# Patient Record
Sex: MALE
[De-identification: names, ages, dates, MRNs, and addresses within clinical notes are randomized per-mention and may not be internally consistent; named-entity substitution may affect disease eponyms.]

---

## 2018-11-24 ENCOUNTER — HOSPITAL ENCOUNTER (EMERGENCY)
Dept: HOSPITAL 92 - SCSER | Age: 43
Discharge: HOME | End: 2018-11-24
Payer: SELF-PAY

## 2018-11-24 DIAGNOSIS — F41.9: ICD-10-CM

## 2018-11-24 DIAGNOSIS — E78.5: ICD-10-CM

## 2018-11-24 DIAGNOSIS — Z79.899: ICD-10-CM

## 2018-11-24 DIAGNOSIS — K52.9: Primary | ICD-10-CM

## 2018-11-24 DIAGNOSIS — E11.9: ICD-10-CM

## 2018-11-24 LAB
ALBUMIN SERPL BCG-MCNC: 4.9 G/DL (ref 3.5–5)
ALP SERPL-CCNC: 79 U/L (ref 40–150)
ALT SERPL W P-5'-P-CCNC: 87 U/L (ref 8–55)
ANION GAP SERPL CALC-SCNC: 16 MMOL/L (ref 10–20)
AST SERPL-CCNC: 34 U/L (ref 5–34)
BASOPHILS # BLD AUTO: 0.2 THOU/UL (ref 0–0.2)
BASOPHILS NFR BLD AUTO: 1.3 % (ref 0–1)
BILIRUB SERPL-MCNC: 0.8 MG/DL (ref 0.2–1.2)
BUN SERPL-MCNC: 18 MG/DL (ref 8.9–20.6)
CALCIUM SERPL-MCNC: 9.4 MG/DL (ref 7.8–10.44)
CHLORIDE SERPL-SCNC: 108 MMOL/L (ref 98–107)
CO2 SERPL-SCNC: 16 MMOL/L (ref 22–29)
CREAT CL PREDICTED SERPL C-G-VRATE: 0 ML/MIN (ref 70–130)
EOSINOPHIL # BLD AUTO: 0.1 THOU/UL (ref 0–0.7)
EOSINOPHIL NFR BLD AUTO: 0.6 % (ref 0–10)
GLOBULIN SER CALC-MCNC: 3.6 G/DL (ref 2.4–3.5)
GLUCOSE SERPL-MCNC: 108 MG/DL (ref 70–105)
HGB BLD-MCNC: 17.2 G/DL (ref 14–18)
LYMPHOCYTES # BLD: 2.3 THOU/UL (ref 1.2–3.4)
LYMPHOCYTES NFR BLD AUTO: 19.6 % (ref 21–51)
MCH RBC QN AUTO: 30.2 PG (ref 27–31)
MCV RBC AUTO: 85.2 FL (ref 78–98)
MONOCYTES # BLD AUTO: 1.1 THOU/UL (ref 0.11–0.59)
MONOCYTES NFR BLD AUTO: 9.6 % (ref 0–10)
NEUTROPHILS # BLD AUTO: 8.1 THOU/UL (ref 1.4–6.5)
NEUTROPHILS NFR BLD AUTO: 68.9 % (ref 42–75)
PLATELET # BLD AUTO: 301 THOU/UL (ref 130–400)
POTASSIUM SERPL-SCNC: 3.3 MMOL/L (ref 3.5–5.1)
RBC # BLD AUTO: 5.7 MILL/UL (ref 4.7–6.1)
SODIUM SERPL-SCNC: 137 MMOL/L (ref 136–145)
WBC # BLD AUTO: 11.8 THOU/UL (ref 4.8–10.8)

## 2018-11-24 PROCEDURE — 87046 STOOL CULTR AEROBIC BACT EA: CPT

## 2018-11-24 PROCEDURE — 87324 CLOSTRIDIUM AG IA: CPT

## 2018-11-24 PROCEDURE — 87045 FECES CULTURE AEROBIC BACT: CPT

## 2018-11-24 PROCEDURE — 85025 COMPLETE CBC W/AUTO DIFF WBC: CPT

## 2018-11-24 PROCEDURE — 87329 GIARDIA AG IA: CPT

## 2018-11-24 PROCEDURE — 83690 ASSAY OF LIPASE: CPT

## 2018-11-24 PROCEDURE — 87449 NOS EACH ORGANISM AG IA: CPT

## 2018-11-24 PROCEDURE — 96360 HYDRATION IV INFUSION INIT: CPT

## 2018-11-24 PROCEDURE — 74177 CT ABD & PELVIS W/CONTRAST: CPT

## 2018-11-24 PROCEDURE — 80053 COMPREHEN METABOLIC PANEL: CPT

## 2018-11-24 PROCEDURE — 87328 CRYPTOSPORIDIUM AG IA: CPT

## 2018-11-24 PROCEDURE — 87899 AGENT NOS ASSAY W/OPTIC: CPT

## 2018-11-24 NOTE — CT
CT ABDOMEN AND PELVIS WITH CONTRAST:

11/24/18

 

HISTORY: 

Abdominal pain, nausea and vomiting. 

 

COMPARISON:  

CT abdomen and pelvis from 2014.

 

FINDINGS:  

Lung bases are clear. No pericardial effusion. Diffuse hepatic steatosis. The aortoiliac contour is n
onaneurysmal. No dilated loops of large or small bowel.

 

The appendix is visualized and is normal. No retroperitoneal adenopathy. 

 

Kidneys are unremarkable as well as pancreas. Prior cholecystectomy. 

 

Normal proximal small bowel rotation. The spleen is unremarkable as well as the adrenal glands. 

 

Mild facet arthropathy lower lumbar spine. Scattered bone islands. 

 

IMPRESSION:  

No acute inflammatory process in the abdomen or pelvis. 

 

POS: [facility]

## 2019-01-16 ENCOUNTER — HOSPITAL ENCOUNTER (EMERGENCY)
Dept: HOSPITAL 92 - SCSER | Age: 44
Discharge: HOME | End: 2019-01-16
Payer: COMMERCIAL

## 2019-01-16 DIAGNOSIS — F41.9: ICD-10-CM

## 2019-01-16 DIAGNOSIS — M54.5: ICD-10-CM

## 2019-01-16 DIAGNOSIS — Z79.899: ICD-10-CM

## 2019-01-16 DIAGNOSIS — E11.9: ICD-10-CM

## 2019-01-16 DIAGNOSIS — S60.222A: Primary | ICD-10-CM

## 2019-01-16 DIAGNOSIS — E78.5: ICD-10-CM
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
## 2019-01-16 PROCEDURE — 72100 X-RAY EXAM L-S SPINE 2/3 VWS: CPT
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
## 2019-01-28 ENCOUNTER — HOSPITAL ENCOUNTER (OUTPATIENT)
Dept: HOSPITAL 18 - NAV RAD | Age: 44
Discharge: HOME | End: 2019-01-28
Payer: COMMERCIAL

## 2019-01-28 DIAGNOSIS — M79.642: Primary | ICD-10-CM
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
## 2020-10-07 ENCOUNTER — HOSPITAL ENCOUNTER (EMERGENCY)
Dept: HOSPITAL 18 - NAV ERS | Age: 45
Discharge: HOME | End: 2020-10-07
Payer: COMMERCIAL

## 2020-10-07 DIAGNOSIS — E78.00: ICD-10-CM

## 2020-10-07 DIAGNOSIS — E78.5: ICD-10-CM

## 2020-10-07 DIAGNOSIS — F41.9: ICD-10-CM

## 2020-10-07 DIAGNOSIS — M54.5: Primary | ICD-10-CM

## 2020-10-07 DIAGNOSIS — R31.9: ICD-10-CM

## 2020-10-07 LAB
RBC UR QL AUTO: (no result) HPF (ref 0–3)
SP GR UR STRIP: 1.02 (ref 1–1.03)

## 2020-10-07 PROCEDURE — 81015 MICROSCOPIC EXAM OF URINE: CPT

## 2020-10-07 PROCEDURE — 81003 URINALYSIS AUTO W/O SCOPE: CPT

## 2020-10-07 PROCEDURE — 74176 CT ABD & PELVIS W/O CONTRAST: CPT

## 2020-10-07 NOTE — CT
Exam: Abdomen CT without contrast

Pelvic CT without contrast



HISTORY: Right lumbar pain, x24 hours. Hematuria.



COMPARISON: None



FINDINGS:

Abdomen CT:

Lung bases:Clear

Heart size: Normal heart size

Aorta: Normal caliber. No periaortic fat stranding.

Solid organs: Limited evaluation by the lack of IV contrast. Grossly no solid organ abnormality

Lymph nodes: No gastrohepatic, retrocrural or periportal lymphadenopathy

Gallbladder: Surgically absent

Mesentery: No mass, lymphadenopathy, free air or free fluid

Kidneys: Bilaterally, no hydronephrosis, nephrolithiasis or perinephric fat stranding. Bilateral uret
ers have a normal caliber. No hydroureter, periureteral fat stranding or ureterolithiasis.

Alimentary canal: Limited evaluation by the lack of oral contrast. No small bowel obstruction. Append
icolith without evidence of appendicitis. This:. Diverticulosis. No diverticulitis.





CT PELVIS: 

No mass, adenopathy, free air or free fluid. 

Urinary bladder: Unremarkable.



Osseous structures: No lytic or blastic lesions. Grossly the central spinal canal is patent. No evide
nce of fractures or malalignment.



IMPRESSION:

No evidence of obstructive uropathy.



Reported By: Tevin Hector 

Electronically Signed:  10/7/2020 7:10 PM

## 2021-08-24 ENCOUNTER — HOSPITAL ENCOUNTER (OUTPATIENT)
Dept: HOSPITAL 92 - CSHULT | Age: 46
Discharge: HOME | End: 2021-08-24
Attending: FAMILY MEDICINE
Payer: COMMERCIAL

## 2021-08-24 DIAGNOSIS — R74.01: Primary | ICD-10-CM

## 2021-08-24 DIAGNOSIS — Z90.49: ICD-10-CM

## 2021-08-24 DIAGNOSIS — K76.0: ICD-10-CM

## 2021-08-24 PROCEDURE — 93975 VASCULAR STUDY: CPT

## 2022-10-06 ENCOUNTER — HOSPITAL ENCOUNTER (OUTPATIENT)
Dept: HOSPITAL 92 - CSHULT | Age: 47
Discharge: HOME | End: 2022-10-06
Attending: FAMILY MEDICINE
Payer: COMMERCIAL

## 2022-10-06 DIAGNOSIS — R74.8: Primary | ICD-10-CM

## 2022-10-06 DIAGNOSIS — K76.0: ICD-10-CM

## 2022-10-06 PROCEDURE — 76705 ECHO EXAM OF ABDOMEN: CPT
